# Patient Record
Sex: FEMALE | Race: WHITE | Employment: OTHER | URBAN - METROPOLITAN AREA
[De-identification: names, ages, dates, MRNs, and addresses within clinical notes are randomized per-mention and may not be internally consistent; named-entity substitution may affect disease eponyms.]

---

## 2022-06-04 ENCOUNTER — CLAIMS CREATED FROM THE CLAIM WINDOW (OUTPATIENT)
Dept: URBAN - METROPOLITAN AREA MEDICAL CENTER 19 | Facility: MEDICAL CENTER | Age: 87
End: 2022-06-04
Payer: MEDICARE

## 2022-06-04 DIAGNOSIS — R17 CHOLESTATIC JAUNDICE: ICD-10-CM

## 2022-06-04 PROCEDURE — 99222 1ST HOSP IP/OBS MODERATE 55: CPT | Performed by: INTERNAL MEDICINE

## 2022-06-05 ENCOUNTER — CLAIMS CREATED FROM THE CLAIM WINDOW (OUTPATIENT)
Dept: URBAN - METROPOLITAN AREA MEDICAL CENTER 19 | Facility: MEDICAL CENTER | Age: 87
End: 2022-06-05
Payer: MEDICARE

## 2022-06-05 DIAGNOSIS — K83.1 OBSTRUCTION OF BILE DUCT: ICD-10-CM

## 2022-06-05 PROCEDURE — 99232 SBSQ HOSP IP/OBS MODERATE 35: CPT | Performed by: INTERNAL MEDICINE

## 2022-06-06 ENCOUNTER — OUT OF OFFICE VISIT (OUTPATIENT)
Dept: URBAN - METROPOLITAN AREA MEDICAL CENTER 19 | Facility: MEDICAL CENTER | Age: 87
End: 2022-06-06
Payer: MEDICARE

## 2022-06-06 DIAGNOSIS — K31.5 DUODENAL STENOSIS: ICD-10-CM

## 2022-06-06 DIAGNOSIS — R59.0 ABDOMINAL LYMPHADENOPATHY: ICD-10-CM

## 2022-06-06 DIAGNOSIS — K83.8 ACQUIRED DILATION OF BILE DUCT: ICD-10-CM

## 2022-06-06 DIAGNOSIS — K83.1 AMPULLA OF VATER OBSTRUCTION SYNDROME: ICD-10-CM

## 2022-06-06 PROCEDURE — 43274 ERCP DUCT STENT PLACEMENT: CPT | Performed by: INTERNAL MEDICINE

## 2022-06-06 PROCEDURE — 43245 EGD DILATE STRICTURE: CPT | Performed by: INTERNAL MEDICINE

## 2022-06-06 PROCEDURE — 43242 EGD US FINE NEEDLE BX/ASPIR: CPT | Performed by: INTERNAL MEDICINE

## 2022-06-07 ENCOUNTER — CLAIMS CREATED FROM THE CLAIM WINDOW (OUTPATIENT)
Dept: URBAN - METROPOLITAN AREA MEDICAL CENTER 19 | Facility: MEDICAL CENTER | Age: 87
End: 2022-06-07
Payer: MEDICARE

## 2022-06-07 ENCOUNTER — TELEPHONE ENCOUNTER (OUTPATIENT)
Dept: URBAN - METROPOLITAN AREA CLINIC 113 | Facility: CLINIC | Age: 87
End: 2022-06-07

## 2022-06-07 ENCOUNTER — LAB OUTSIDE AN ENCOUNTER (OUTPATIENT)
Dept: URBAN - METROPOLITAN AREA CLINIC 113 | Facility: CLINIC | Age: 87
End: 2022-06-07

## 2022-06-07 DIAGNOSIS — C24.0 CHOLANGIOCARCINOMA AT HEPATIC HILUM: ICD-10-CM

## 2022-06-07 DIAGNOSIS — K83.1 OBSTRUCTION OF BILE DUCT: ICD-10-CM

## 2022-06-07 PROCEDURE — 99231 SBSQ HOSP IP/OBS SF/LOW 25: CPT | Performed by: PHYSICIAN ASSISTANT

## 2022-06-13 ENCOUNTER — TELEPHONE ENCOUNTER (OUTPATIENT)
Dept: URBAN - METROPOLITAN AREA CLINIC 113 | Facility: CLINIC | Age: 87
End: 2022-06-13

## 2022-06-21 ENCOUNTER — OFFICE VISIT (OUTPATIENT)
Dept: URBAN - METROPOLITAN AREA CLINIC 107 | Facility: CLINIC | Age: 87
End: 2022-06-21
Payer: MEDICARE

## 2022-06-21 ENCOUNTER — WEB ENCOUNTER (OUTPATIENT)
Dept: URBAN - METROPOLITAN AREA CLINIC 107 | Facility: CLINIC | Age: 87
End: 2022-06-21

## 2022-06-21 VITALS
BODY MASS INDEX: 23.19 KG/M2 | TEMPERATURE: 97.3 F | HEART RATE: 79 BPM | WEIGHT: 126 LBS | DIASTOLIC BLOOD PRESSURE: 65 MMHG | RESPIRATION RATE: 20 BRPM | SYSTOLIC BLOOD PRESSURE: 145 MMHG | HEIGHT: 62 IN

## 2022-06-21 DIAGNOSIS — C24.0 CHOLANGIOCARCINOMA AT HEPATIC HILUM: ICD-10-CM

## 2022-06-21 PROBLEM — 73120006 DUODENAL STENOSIS: Status: ACTIVE | Noted: 2022-06-21

## 2022-06-21 PROCEDURE — 99214 OFFICE O/P EST MOD 30 MIN: CPT | Performed by: INTERNAL MEDICINE

## 2022-06-21 RX ORDER — MECLIZINE HYDROCHLORIDE 25 MG/1
1 TABLET AS NEEDED TABLET ORAL ONCE A DAY
Status: ACTIVE | COMMUNITY

## 2022-06-21 RX ORDER — CITALOPRAM 20 MG/1
0.5 TABLET TABLET, FILM COATED ORAL ONCE A DAY
Status: ACTIVE | COMMUNITY

## 2022-06-21 RX ORDER — IPRATROPIUM BROMIDE 42 UG/1
2 SPRAYS IN EACH NOSTRIL SPRAY NASAL THREE TIMES A DAY
Status: ACTIVE | COMMUNITY

## 2022-06-21 RX ORDER — NITROGLYCERIN 0.4 MG/1
AS DIRECTED TABLET SUBLINGUAL
Status: ACTIVE | COMMUNITY

## 2022-06-21 RX ORDER — NAPROXEN SODIUM 220 MG/1
1 TABLET WITH FOOD OR MILK AS NEEDED TABLET ORAL
Status: ACTIVE | COMMUNITY

## 2022-06-21 RX ORDER — ASPIRIN 81 MG/1
1 TABLET TABLET ORAL ONCE A DAY
Status: ACTIVE | COMMUNITY

## 2022-06-21 NOTE — EXAM-PHYSICAL EXAM
She is alert and oriented to person place and situation no acute distress.  There is extensive scleral icterus.

## 2022-06-23 ENCOUNTER — TELEPHONE ENCOUNTER (OUTPATIENT)
Dept: URBAN - METROPOLITAN AREA CLINIC 107 | Facility: CLINIC | Age: 87
End: 2022-06-23

## 2022-06-23 ENCOUNTER — TELEPHONE ENCOUNTER (OUTPATIENT)
Dept: URBAN - METROPOLITAN AREA CLINIC 113 | Facility: CLINIC | Age: 87
End: 2022-06-23

## 2022-06-23 RX ORDER — METRONIDAZOLE 500 MG/1
1 TABLET TABLET ORAL THREE TIMES A DAY
Qty: 42 TABLET | Refills: 0 | OUTPATIENT

## 2022-06-23 RX ORDER — CIPROFLOXACIN HYDROCHLORIDE 500 MG/1
1 TABLET TABLET, FILM COATED ORAL
Qty: 28 TABLET | Refills: 0 | OUTPATIENT

## 2022-06-24 ENCOUNTER — TELEPHONE ENCOUNTER (OUTPATIENT)
Dept: URBAN - METROPOLITAN AREA CLINIC 107 | Facility: CLINIC | Age: 87
End: 2022-06-24

## 2022-06-27 ENCOUNTER — TELEPHONE ENCOUNTER (OUTPATIENT)
Dept: URBAN - METROPOLITAN AREA CLINIC 113 | Facility: CLINIC | Age: 87
End: 2022-06-27

## 2022-06-30 ENCOUNTER — WEB ENCOUNTER (OUTPATIENT)
Dept: URBAN - METROPOLITAN AREA CLINIC 107 | Facility: CLINIC | Age: 87
End: 2022-06-30

## 2022-06-30 ENCOUNTER — OFFICE VISIT (OUTPATIENT)
Dept: URBAN - METROPOLITAN AREA CLINIC 107 | Facility: CLINIC | Age: 87
End: 2022-06-30
Payer: MEDICARE

## 2022-06-30 ENCOUNTER — DASHBOARD ENCOUNTERS (OUTPATIENT)
Age: 87
End: 2022-06-30

## 2022-06-30 VITALS
HEIGHT: 62 IN | TEMPERATURE: 97.4 F | RESPIRATION RATE: 18 BRPM | SYSTOLIC BLOOD PRESSURE: 103 MMHG | BODY MASS INDEX: 24.66 KG/M2 | WEIGHT: 134 LBS | HEART RATE: 75 BPM | DIASTOLIC BLOOD PRESSURE: 49 MMHG

## 2022-06-30 DIAGNOSIS — R19.5 LOOSE STOOLS: ICD-10-CM

## 2022-06-30 DIAGNOSIS — C24.0 CHOLANGIOCARCINOMA AT HEPATIC HILUM: ICD-10-CM

## 2022-06-30 PROCEDURE — 99214 OFFICE O/P EST MOD 30 MIN: CPT

## 2022-06-30 RX ORDER — CHOLESTYRAMINE POWDER FOR SUSPENSION 4 G/8.78G
1 PACKET MIXED WITH WATER OR NON-CARBONATED DRINK POWDER, FOR SUSPENSION ORAL ONCE A DAY
Qty: 30 | Refills: 6 | OUTPATIENT
Start: 2022-06-30

## 2022-06-30 RX ORDER — MECLIZINE HYDROCHLORIDE 25 MG/1
1 TABLET AS NEEDED TABLET ORAL ONCE A DAY
Status: ACTIVE | COMMUNITY

## 2022-06-30 RX ORDER — CITALOPRAM 20 MG/1
0.5 TABLET TABLET, FILM COATED ORAL ONCE A DAY
Status: ACTIVE | COMMUNITY

## 2022-06-30 RX ORDER — ONDANSETRON HYDROCHLORIDE 4 MG/1
1 TABLET TABLET, FILM COATED ORAL ONCE A DAY
Status: ACTIVE | COMMUNITY

## 2022-06-30 RX ORDER — NITROGLYCERIN 0.4 MG/1
AS DIRECTED TABLET SUBLINGUAL
Status: ACTIVE | COMMUNITY

## 2022-06-30 RX ORDER — CIPROFLOXACIN HYDROCHLORIDE 500 MG/1
1 TABLET TABLET, FILM COATED ORAL
Qty: 28 TABLET | Refills: 0 | Status: ACTIVE | COMMUNITY

## 2022-06-30 RX ORDER — METRONIDAZOLE 500 MG/1
1 TABLET TABLET ORAL THREE TIMES A DAY
Qty: 42 TABLET | Refills: 0 | Status: ACTIVE | COMMUNITY

## 2022-06-30 RX ORDER — ASPIRIN 81 MG/1
1 TABLET TABLET ORAL ONCE A DAY
Status: ACTIVE | COMMUNITY

## 2022-06-30 RX ORDER — IPRATROPIUM BROMIDE 42 UG/1
2 SPRAYS IN EACH NOSTRIL SPRAY NASAL THREE TIMES A DAY
Status: ACTIVE | COMMUNITY

## 2022-06-30 RX ORDER — NAPROXEN SODIUM 220 MG/1
1 TABLET WITH FOOD OR MILK AS NEEDED TABLET ORAL
Status: ACTIVE | COMMUNITY

## 2022-06-30 NOTE — PHYSICAL EXAM CONSTITUTIONAL:
alert ,  pleasant, well nourished, in no acute distress , elderly , ill-appearing , thin , confined to a wheelchair , fatigued

## 2022-06-30 NOTE — HPI-TODAY'S VISIT:
The patient is a very delightful 89-year-old female who was admitted with biliary obstruction.  She underwent ERCP by Dr. Goodman along with EUS.  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  Though brushings and biopsies came back negative for malignancy.  Stent was placed.  Patient is here for follow-up. The patient unfortunately has taken a significant clinical decline at home with further decreasing appetite along with pruritus.  They bring with him laboratory testing from last week which revealed her total bilirubin actually increased to 12 and her alkaline phosphatase increased to 770.  Interval history: 89-year-old female presents for follow-up.  She was last seen by Dr. Abebe on 6/21/2022 following hospitalization for biliary obstruction.  She underwent ERCP with EUS by Dr. Goodman which was notable for hilar cholangiocarcinoma despite negative biopsies and brushings for malignancy.  She is status post stent placement though these are notoriously difficult to drain. 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  She was planned for urgent labs to include CBC and CMP as well as an urgent CT scan abdomen/pelvis with contrast.  Treatment options are limited and this was discussed with patient and daughter at length.  Case was also discussed with Dr. Goodman.  CT scan of the abdomen/pelvis with contrast 6/22/22:Significant intrahepatic biliary dilatation with common bile ducts/biliary stent in place partially decompresses the right intrahepatic ducts.  The left remains significantly dilated.  There is mild circumferential smooth thickening along the mid to lower common bile duct which is felt reactive in etiology.  Large diverticulum along the descending duodenum extending to the periampullary region.  No discrete mass identified however findings raise concern for small occult biliary stricture/mass.  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  Labs 6/23/2022: Elevated WBC 11.3, low hemoglobin 10.1, low hematocrit 28.9, normal MCV, elevated glucose 101, low BUN of 9, creatinine 0.55, low sodium 133, elevated total bilirubin 14.7, elevated alkaline phosphatase 900, elevated , elevated .  Patient was started on ciprofloxacin and metronidazole.  She was planned for repeat CBC and CMP to be performed on 6/28 with home health nurse.  Labs 6/28/22: normal WBC, low hemoglobin 9.9, low hematocrit 28.4, normal MCV, elevated glucose 117, low sodium 129, total bilirubin 14.4, elevated , elevated , elevated ALT 81, elevated INR 1.7, elevated PT 17.7.  Patient presents with her three daughters. She continues to feel weak and her appetite is very poor. Her daughters are attempting to feed her 3 meals per day. She does have nausea which is fairly controlled with Zofran as needed. No vomiting. She is having "mushy, loose" bowel movements up to four times per day. She states this is most bothersome for her at the moment. Her daughter who is a retired RN states it does not have the odor of a C. difficile infection. She denies blood per rectum or melena. She denies abdominal pain. Her stools are "bronze" and her urine is dark orange. She admits to shortness of breath with exertion ongoing for a couple of weeks. Her daughter states she has had shortness of breath while seated occasionally. She denies chest pain or dizziness. She has had swelling of her lower feet within the past week as well as an 8 pound weight gain since her last visit on 6/21. Her daughters have several questions regarding her diagnosis, prognosis and next steps in treatment.

## 2022-07-01 ENCOUNTER — TELEPHONE ENCOUNTER (OUTPATIENT)
Dept: URBAN - METROPOLITAN AREA CLINIC 113 | Facility: CLINIC | Age: 87
End: 2022-07-01

## 2022-07-01 PROBLEM — 253017000: Status: ACTIVE | Noted: 2022-06-07

## 2022-07-21 ENCOUNTER — TELEPHONE ENCOUNTER (OUTPATIENT)
Dept: URBAN - METROPOLITAN AREA CLINIC 107 | Facility: CLINIC | Age: 87
End: 2022-07-21

## 2022-08-02 ENCOUNTER — WEB ENCOUNTER (OUTPATIENT)
Dept: URBAN - METROPOLITAN AREA CLINIC 107 | Facility: CLINIC | Age: 87
End: 2022-08-02

## 2022-08-09 ENCOUNTER — OFFICE VISIT (OUTPATIENT)
Dept: URBAN - METROPOLITAN AREA CLINIC 113 | Facility: CLINIC | Age: 87
End: 2022-08-09

## 2022-08-22 ENCOUNTER — OFFICE VISIT (OUTPATIENT)
Dept: URBAN - METROPOLITAN AREA CLINIC 107 | Facility: CLINIC | Age: 87
End: 2022-08-22